# Patient Record
Sex: FEMALE | Race: WHITE | NOT HISPANIC OR LATINO | Employment: FULL TIME | URBAN - METROPOLITAN AREA
[De-identification: names, ages, dates, MRNs, and addresses within clinical notes are randomized per-mention and may not be internally consistent; named-entity substitution may affect disease eponyms.]

---

## 2017-03-30 ENCOUNTER — OFFICE VISIT (OUTPATIENT)
Dept: INTERNAL MEDICINE | Facility: CLINIC | Age: 27
End: 2017-03-30

## 2017-03-30 VITALS
HEART RATE: 82 BPM | BODY MASS INDEX: 20.64 KG/M2 | WEIGHT: 136.2 LBS | DIASTOLIC BLOOD PRESSURE: 78 MMHG | TEMPERATURE: 98.1 F | HEIGHT: 68 IN | SYSTOLIC BLOOD PRESSURE: 118 MMHG | OXYGEN SATURATION: 99 %

## 2017-03-30 DIAGNOSIS — Z00.00 ANNUAL PHYSICAL EXAM: Primary | ICD-10-CM

## 2017-03-30 DIAGNOSIS — Z13.29 SCREENING FOR THYROID DISORDER: ICD-10-CM

## 2017-03-30 DIAGNOSIS — Z13.220 SCREENING FOR HYPERLIPIDEMIA: ICD-10-CM

## 2017-03-30 DIAGNOSIS — Z13.1 SCREENING FOR DIABETES MELLITUS: ICD-10-CM

## 2017-03-30 DIAGNOSIS — R21 SKIN RASH: ICD-10-CM

## 2017-03-30 DIAGNOSIS — Z00.00 HEALTH CARE MAINTENANCE: ICD-10-CM

## 2017-03-30 PROCEDURE — 93000 ELECTROCARDIOGRAM COMPLETE: CPT | Performed by: INTERNAL MEDICINE

## 2017-03-30 PROCEDURE — 99395 PREV VISIT EST AGE 18-39: CPT | Performed by: INTERNAL MEDICINE

## 2017-03-30 RX ORDER — PRENATAL VIT NO.126/IRON/FOLIC 28MG-0.8MG
TABLET ORAL DAILY
COMMUNITY

## 2017-03-30 RX ORDER — TRIAMCINOLONE ACETONIDE 0.25 MG/G
OINTMENT TOPICAL 2 TIMES DAILY
Qty: 15 G | Refills: 0 | Status: SHIPPED | OUTPATIENT
Start: 2017-03-30

## 2017-03-30 NOTE — PROGRESS NOTES
Patient Name: Taylor Vazquez is a 27 y.o. female presenting for Annual Exam and Rash    CPE: patient is her to establish care and works out 5 days per week. Overall, she eats a good diet and her and her  try to eat organic if possible. She has been meditating to help with anxiety. She does not take any medications and her vaccines and pap smear are up to date.     Well Adult Physical   Patient here for a comprehensive physical exam.The patient reports problems - Skin rash that she has had for years and has used over the counter ointments that has not always helped. The rash is red and itchy on both of her knees. It never bleeds or hurts.     Do you take any herbs or supplements that were not prescribed by a doctor? no   Are you taking calcium supplements? no   Are you taking aspirin daily? no     History:  LMP:Normal and coming every month and is trying to get pregnant   Last pap date: 2016  Abnormal pap? no  : 0  Para: 0    Taylor Cho 27 y.o. female who presents for an Annual Wellness Visit.  she has a history of   Patient Active Problem List   Diagnosis   • Skin rash   • Annual physical exam   She has been doing well with new interval problems.      Health Habits:  Dental Exam. up to date  Eye Exam. up to date  Exercise: 6 times/week.  Current exercise activities include: cardiovascular workout on exercise equipment, light weights/kettlebells and weightlifting    Last pap date: 2016  Abnormal pap? Never   Mammogram:N/A   Dexa:N/A  Colonoscopy:N/A  Tob use:Never       The following portions of the patient's history were reviewed and updated as appropriate: allergies, current medications, past family history, past medical history, past social history, past surgical history and problem list.    Review of Systems   Constitutional: Negative for activity change, chills and fever.   HENT: Negative for congestion and sinus pressure.    Respiratory: Negative for cough and  "shortness of breath.    Cardiovascular: Negative for chest pain and palpitations.   Gastrointestinal: Negative for abdominal pain, diarrhea, nausea and vomiting.   Genitourinary: Negative for difficulty urinating and dysuria.   Musculoskeletal: Negative for arthralgias and gait problem.   Skin: Positive for rash.   Neurological: Negative for dizziness and headaches.   Psychiatric/Behavioral: Negative for sleep disturbance.       Review of patient's allergies indicates no known allergies.      Current Outpatient Prescriptions:   •  Prenatal Vit-Fe Fumarate-FA (PRENATAL, CLASSIC, VITAMIN) 28-0.8 MG tablet tablet, Take  by mouth Daily., Disp: , Rfl:   •  triamcinolone (KENALOG) 0.025 % ointment, Apply  topically 2 (Two) Times a Day., Disp: 15 g, Rfl: 0    OBJECTIVE    /78 (BP Location: Left arm, Patient Position: Sitting, Cuff Size: Adult)  Pulse 82  Temp 98.1 °F (36.7 °C) (Oral)   Ht 68\" (172.7 cm)  Wt 136 lb 3.2 oz (61.8 kg)  SpO2 99%  BMI 20.71 kg/m2      Physical Exam   Constitutional: She is oriented to person, place, and time. She appears well-developed and well-nourished. No distress.   HENT:   Head: Normocephalic and atraumatic.   Right Ear: Tympanic membrane and external ear normal.   Left Ear: Tympanic membrane and external ear normal.   Nose: Nose normal.   Mouth/Throat: Uvula is midline, oropharynx is clear and moist and mucous membranes are normal. No oropharyngeal exudate. Tonsils are 0 on the right. Tonsils are 0 on the left. No tonsillar exudate.   Eyes: Conjunctivae, EOM and lids are normal. Pupils are equal, round, and reactive to light. Right eye exhibits no discharge. Left eye exhibits no discharge. No scleral icterus. Right eye exhibits normal extraocular motion.   Neck: Full passive range of motion without pain. Neck supple. Carotid bruit is not present. No thyromegaly present.   Cardiovascular: Normal rate, regular rhythm and normal heart sounds.  Exam reveals no gallop and no " friction rub.    No murmur heard.  Pulmonary/Chest: Effort normal and breath sounds normal. No respiratory distress. She has no wheezes. She has no rales.   Abdominal: Soft. Bowel sounds are normal. She exhibits no distension and no mass. There is no tenderness. There is no guarding.   Musculoskeletal: She exhibits no edema or tenderness.   Lymphadenopathy:     She has no cervical adenopathy.   Neurological: She is alert and oriented to person, place, and time. She displays normal reflexes. She exhibits normal muscle tone.   Skin: Skin is warm. No rash noted.   Psychiatric: She has a normal mood and affect. Her behavior is normal.   Nursing note and vitals reviewed.      ECG 12 Lead  Date/Time: 3/30/2017 8:55 AM  Performed by: SEN ZURITA  Authorized by: SEN ZURITA   Previous ECG: no previous ECG available  Rhythm: sinus rhythm  Rate: normal  ST Segments: ST segments normal  T Waves: T waves normal  QRS axis: normal  Other: no other findings  Clinical impression: normal ECG            ASSESSMENT AND PLAN  Basia is very healthy and has no medical problems other than her skin rash today which I think may be psoriasis due to the location.We will try a topical steroid ointment to use to help with rash when she needs it.  She is in excellent health and we discussed the importance of a healthy diet and exercise as well daily prenatal vitamin as she is trying to get pregnant.  She will get Tdap when she is pregnant. She is going to continue to follow a low fat, low cholesterol diet, reduce exposure to stress, continue current medications and continue current healthy lifestyle patterns. I will call her back with labs and she will follow up as needed for sick visits or concerns.     Taylor was seen today for annual exam and rash.    Diagnoses and all orders for this visit:    Annual physical exam    Screening for thyroid disorder  -     TSH Rfx On Abnormal To Free T4    Screening for hyperlipidemia  -     Lipid  Panel With LDL / HDL Ratio    Screening for diabetes mellitus  -     CBC & Differential    Skin rash    Health care maintenance  -     CBC & Differential  -     Comprehensive Metabolic Panel  -     Urinalysis With / Culture If Indicated  -     ECG 12 Lead    Other orders  -     triamcinolone (KENALOG) 0.025 % ointment; Apply  topically 2 (Two) Times a Day.            Return if symptoms worsen or fail to improve.

## 2017-04-01 LAB
ALBUMIN SERPL-MCNC: 4.6 G/DL (ref 3.5–5.2)
ALBUMIN/GLOB SERPL: 2 G/DL
ALP SERPL-CCNC: 35 U/L (ref 40–129)
ALT SERPL-CCNC: 17 U/L (ref 5–33)
APPEARANCE UR: CLEAR
AST SERPL-CCNC: 20 U/L (ref 5–32)
BACTERIA #/AREA URNS HPF: NORMAL /HPF
BACTERIA UR CULT: ABNORMAL
BACTERIA UR CULT: ABNORMAL
BASOPHILS # BLD AUTO: 0.04 10*3/MM3 (ref 0–0.2)
BASOPHILS NFR BLD AUTO: 1 % (ref 0–2)
BILIRUB SERPL-MCNC: 0.4 MG/DL (ref 0.2–1.2)
BILIRUB UR QL STRIP: NEGATIVE
BUN SERPL-MCNC: 17 MG/DL (ref 6–20)
BUN/CREAT SERPL: 19.1 (ref 7–25)
CALCIUM SERPL-MCNC: 9.2 MG/DL (ref 8.6–10.5)
CHLORIDE SERPL-SCNC: 103 MMOL/L (ref 98–107)
CHOLEST SERPL-MCNC: 165 MG/DL (ref 0–200)
CO2 SERPL-SCNC: 28.3 MMOL/L (ref 22–29)
COLOR UR: YELLOW
CREAT SERPL-MCNC: 0.89 MG/DL (ref 0.57–1)
EOSINOPHIL # BLD AUTO: 0.07 10*3/MM3 (ref 0.1–0.3)
EOSINOPHIL NFR BLD AUTO: 1.8 % (ref 0–4)
EPI CELLS #/AREA URNS HPF: NORMAL /HPF
ERYTHROCYTE [DISTWIDTH] IN BLOOD BY AUTOMATED COUNT: 12.8 % (ref 11.5–14.5)
GLOBULIN SER CALC-MCNC: 2.3 GM/DL
GLUCOSE SERPL-MCNC: 91 MG/DL (ref 65–99)
GLUCOSE UR QL: NEGATIVE
HCT VFR BLD AUTO: 45.1 % (ref 37–47)
HDLC SERPL-MCNC: 66 MG/DL (ref 40–60)
HGB BLD-MCNC: 14.5 G/DL (ref 12–16)
HGB UR QL STRIP: NEGATIVE
IMM GRANULOCYTES # BLD: 0 10*3/MM3 (ref 0–0.03)
IMM GRANULOCYTES NFR BLD: 0 % (ref 0–0.5)
KETONES UR QL STRIP: NEGATIVE
LDLC SERPL CALC-MCNC: 92 MG/DL (ref 0–100)
LDLC/HDLC SERPL: 1.4 {RATIO}
LEUKOCYTE ESTERASE UR QL STRIP: ABNORMAL
LYMPHOCYTES # BLD AUTO: 1.26 10*3/MM3 (ref 0.6–4.8)
LYMPHOCYTES NFR BLD AUTO: 32.2 % (ref 20–45)
MCH RBC QN AUTO: 29.7 PG (ref 27–31)
MCHC RBC AUTO-ENTMCNC: 32.2 G/DL (ref 31–37)
MCV RBC AUTO: 92.2 FL (ref 81–99)
MICRO URNS: ABNORMAL
MONOCYTES # BLD AUTO: 0.41 10*3/MM3 (ref 0–1)
MONOCYTES NFR BLD AUTO: 10.5 % (ref 3–8)
MUCOUS THREADS URNS QL MICRO: PRESENT /HPF
NEUTROPHILS # BLD AUTO: 2.13 10*3/MM3 (ref 1.5–8.3)
NEUTROPHILS NFR BLD AUTO: 54.5 % (ref 45–70)
NITRITE UR QL STRIP: NEGATIVE
NRBC BLD AUTO-RTO: 0 /100 WBC (ref 0–0)
OTHER ANTIBIOTIC SUSC ISLT: ABNORMAL
PH UR STRIP: 6.5 [PH] (ref 5–7.5)
PLATELET # BLD AUTO: 179 10*3/MM3 (ref 140–500)
POTASSIUM SERPL-SCNC: 4 MMOL/L (ref 3.5–5.2)
PROT SERPL-MCNC: 6.9 G/DL (ref 6–8.5)
PROT UR QL STRIP: NEGATIVE
RBC # BLD AUTO: 4.89 10*6/MM3 (ref 4.2–5.4)
RBC #/AREA URNS HPF: NORMAL /HPF
SODIUM SERPL-SCNC: 142 MMOL/L (ref 136–145)
SP GR UR: 1.03 (ref 1–1.03)
T4 FREE SERPL-MCNC: 1.2 NG/DL (ref 0.93–1.7)
TRIGL SERPL-MCNC: 34 MG/DL (ref 0–150)
TSH SERPL DL<=0.005 MIU/L-ACNC: 5.13 MIU/ML (ref 0.27–4.2)
URINALYSIS REFLEX: ABNORMAL
UROBILINOGEN UR STRIP-MCNC: 0.2 MG/DL (ref 0.2–1)
VLDLC SERPL CALC-MCNC: 6.8 MG/DL (ref 7–27)
WBC # BLD AUTO: 3.91 10*3/MM3 (ref 4.8–10.8)
WBC #/AREA URNS HPF: NORMAL /HPF

## 2017-04-03 ENCOUNTER — DOCUMENTATION (OUTPATIENT)
Dept: INTERNAL MEDICINE | Facility: CLINIC | Age: 27
End: 2017-04-03

## 2017-04-03 RX ORDER — NITROFURANTOIN 25; 75 MG/1; MG/1
100 CAPSULE ORAL 2 TIMES DAILY
Qty: 14 CAPSULE | Refills: 0 | Status: SHIPPED | OUTPATIENT
Start: 2017-04-03 | End: 2017-04-10

## 2017-04-03 NOTE — PROGRESS NOTES
I called patient and discussed labs results and sent in Macrobid to pharmacy for her for E. faecalis UTI or possible colonization. Will repeat TSH and FT4 in 4-6 weeks to make sure that subclinical hypothyroidism has resolved. Patient's period is two days late if pregnant, we discussed that Macrobid would be safe.

## 2017-04-04 ENCOUNTER — TELEPHONE (OUTPATIENT)
Dept: INTERNAL MEDICINE | Facility: CLINIC | Age: 27
End: 2017-04-04

## 2017-04-04 DIAGNOSIS — R79.89 ELEVATED TSH: Primary | ICD-10-CM

## 2017-04-04 NOTE — TELEPHONE ENCOUNTER
----- Message from Tania Olmedo MD sent at 4/3/2017  7:41 AM EDT -----  Please call patient with these results and let her know that everything looks great on her labs, but I would like to call her about her thyroid and urine test to discuss in more detail. When is a good time for me to call today either around 12:30 around lunch or after work around 5pm?

## 2018-04-16 ENCOUNTER — OFFICE VISIT (OUTPATIENT)
Dept: INTERNAL MEDICINE | Facility: CLINIC | Age: 28
End: 2018-04-16

## 2018-04-16 VITALS
RESPIRATION RATE: 14 BRPM | TEMPERATURE: 98.2 F | SYSTOLIC BLOOD PRESSURE: 118 MMHG | HEART RATE: 81 BPM | BODY MASS INDEX: 21.95 KG/M2 | DIASTOLIC BLOOD PRESSURE: 74 MMHG | HEIGHT: 68 IN | WEIGHT: 144.8 LBS | OXYGEN SATURATION: 98 %

## 2018-04-16 DIAGNOSIS — R10.13 COLICKY EPIGASTRIC PAIN: Primary | ICD-10-CM

## 2018-04-16 DIAGNOSIS — R19.5 LIGHT STOOLS: ICD-10-CM

## 2018-04-16 DIAGNOSIS — R11.0 NAUSEA: ICD-10-CM

## 2018-04-16 LAB
ALBUMIN SERPL-MCNC: 4.3 G/DL (ref 3.5–5.2)
ALBUMIN/GLOB SERPL: 1.8 G/DL
ALP SERPL-CCNC: 39 U/L (ref 40–129)
ALT SERPL-CCNC: 13 U/L (ref 5–33)
AST SERPL-CCNC: 20 U/L (ref 5–32)
BASOPHILS # BLD AUTO: 0.02 10*3/MM3 (ref 0–0.2)
BASOPHILS NFR BLD AUTO: 0.2 % (ref 0–2)
BILIRUB SERPL-MCNC: 0.2 MG/DL (ref 0.2–1.2)
BUN SERPL-MCNC: 12 MG/DL (ref 6–20)
BUN/CREAT SERPL: 16 (ref 7–25)
CALCIUM SERPL-MCNC: 9.4 MG/DL (ref 8.6–10.5)
CHLORIDE SERPL-SCNC: 101 MMOL/L (ref 98–107)
CO2 SERPL-SCNC: 28.3 MMOL/L (ref 22–29)
CREAT SERPL-MCNC: 0.75 MG/DL (ref 0.57–1)
EOSINOPHIL # BLD AUTO: 1.48 10*3/MM3 (ref 0.1–0.3)
EOSINOPHIL NFR BLD AUTO: 13.7 % (ref 0–4)
ERYTHROCYTE [DISTWIDTH] IN BLOOD BY AUTOMATED COUNT: 12.3 % (ref 11.5–14.5)
GFR SERPLBLD CREATININE-BSD FMLA CKD-EPI: 110 ML/MIN/1.73
GFR SERPLBLD CREATININE-BSD FMLA CKD-EPI: 91 ML/MIN/1.73
GLOBULIN SER CALC-MCNC: 2.4 GM/DL
GLUCOSE SERPL-MCNC: 85 MG/DL (ref 65–99)
HCT VFR BLD AUTO: 42.7 % (ref 37–47)
HGB BLD-MCNC: 14.4 G/DL (ref 12–16)
IMM GRANULOCYTES # BLD: 0.02 10*3/MM3 (ref 0–0.03)
IMM GRANULOCYTES NFR BLD: 0.2 % (ref 0–0.5)
LIPASE SERPL-CCNC: 35 U/L (ref 13–60)
LYMPHOCYTES # BLD AUTO: 2.12 10*3/MM3 (ref 0.6–4.8)
LYMPHOCYTES NFR BLD AUTO: 19.6 % (ref 20–45)
MCH RBC QN AUTO: 30.4 PG (ref 27–31)
MCHC RBC AUTO-ENTMCNC: 33.7 G/DL (ref 31–37)
MCV RBC AUTO: 90.3 FL (ref 81–99)
MONOCYTES # BLD AUTO: 0.91 10*3/MM3 (ref 0–1)
MONOCYTES NFR BLD AUTO: 8.4 % (ref 3–8)
NEUTROPHILS # BLD AUTO: 6.29 10*3/MM3 (ref 1.5–8.3)
NEUTROPHILS NFR BLD AUTO: 57.9 % (ref 45–70)
NRBC BLD AUTO-RTO: 0 /100 WBC (ref 0–0)
PLATELET # BLD AUTO: 176 10*3/MM3 (ref 140–500)
POTASSIUM SERPL-SCNC: 4 MMOL/L (ref 3.5–5.2)
PROT SERPL-MCNC: 6.7 G/DL (ref 6–8.5)
RBC # BLD AUTO: 4.73 10*6/MM3 (ref 4.2–5.4)
SODIUM SERPL-SCNC: 140 MMOL/L (ref 136–145)
WBC # BLD AUTO: 10.84 10*3/MM3 (ref 4.8–10.8)

## 2018-04-16 PROCEDURE — 99213 OFFICE O/P EST LOW 20 MIN: CPT | Performed by: INTERNAL MEDICINE

## 2018-04-16 NOTE — PROGRESS NOTES
"Taylor Cho is a 28 y.o. female, who presents with a chief complaint of   Chief Complaint   Patient presents with   • Stool Color Change     grey color, 1 x day    • Abdominal Pain     1 x week, dull pain        27 yo F here for acute visit and started having epigastric abdominal pain about 10 days ago.     Started Nexium last Wednesay that has not helped. Morning time is better. Any food now is causing it flair now. Lighter grey stools this morning. The pain is dull and achy. Crackers and zenaida seltzer makes it better. She has been having harder stools lately. She is trying to get pregnant and is on Femara, Progesterone and Menopur.          The following portions of the patient's history were reviewed and updated as appropriate: allergies, current medications, past family history, past medical history, past social history, past surgical history and problem list.    Allergies: Review of patient's allergies indicates no known allergies.    Current Outpatient Prescriptions:   •  Esomeprazole Magnesium (NEXIUM PO), Take  by mouth., Disp: , Rfl:   •  Prenatal Vit-Fe Fumarate-FA (PRENATAL, CLASSIC, VITAMIN) 28-0.8 MG tablet tablet, Take  by mouth Daily., Disp: , Rfl:   •  triamcinolone (KENALOG) 0.025 % ointment, Apply  topically 2 (Two) Times a Day., Disp: 15 g, Rfl: 0  There are no discontinued medications.    Review of Systems   Constitutional: Negative for chills and fatigue.   Gastrointestinal: Positive for abdominal pain (epigastric pain) and nausea. Negative for blood in stool, diarrhea and vomiting.        Lighter stools    Genitourinary: Negative for difficulty urinating and dysuria.   Skin: Negative for rash.   Neurological: Negative for dizziness and headaches.             /74 (BP Location: Left arm, Patient Position: Sitting, Cuff Size: Adult)   Pulse 81   Temp 98.2 °F (36.8 °C) (Oral)   Resp 14   Ht 172.7 cm (67.99\")   Wt 65.7 kg (144 lb 12.8 oz)   SpO2 98%   BMI 22.02 kg/m² "       Physical Exam   Constitutional: She is oriented to person, place, and time. She appears well-developed and well-nourished. No distress.   HENT:   Head: Normocephalic and atraumatic.   Right Ear: External ear normal.   Left Ear: External ear normal.   Mouth/Throat: Oropharynx is clear and moist. No oropharyngeal exudate.   Eyes: Conjunctivae are normal. Right eye exhibits no discharge. Left eye exhibits no discharge. No scleral icterus.   Neck: Neck supple.   Cardiovascular: Normal rate, regular rhythm and normal heart sounds.  Exam reveals no gallop and no friction rub.    No murmur heard.  Pulmonary/Chest: Effort normal and breath sounds normal. No respiratory distress. She has no wheezes. She has no rales.   Abdominal: Soft. Bowel sounds are normal. She exhibits no distension and no mass. There is no tenderness. There is no guarding.   Lymphadenopathy:     She has no cervical adenopathy.   Neurological: She is alert and oriented to person, place, and time.   Skin: Skin is warm. No rash noted.   Psychiatric: She has a normal mood and affect. Her behavior is normal.   Nursing note and vitals reviewed.        Results for orders placed or performed in visit on 03/30/17   Urine culture, Comprehensive   Result Value Ref Range    Urine Culture Final report (A)     Result 1 Enterococcus faecalis (A)     Susceptibility Testing Comment    Comprehensive Metabolic Panel   Result Value Ref Range    Glucose 91 65 - 99 mg/dL    BUN 17 6 - 20 mg/dL    Creatinine 0.89 0.57 - 1.00 mg/dL    eGFR Non African Am 75 >60 mL/min/1.73    eGFR African Am 91 >60 mL/min/1.73    BUN/Creatinine Ratio 19.1 7.0 - 25.0    Sodium 142 136 - 145 mmol/L    Potassium 4.0 3.5 - 5.2 mmol/L    Chloride 103 98 - 107 mmol/L    Total CO2 28.3 22.0 - 29.0 mmol/L    Calcium 9.2 8.6 - 10.5 mg/dL    Total Protein 6.9 6.0 - 8.5 g/dL    Albumin 4.60 3.50 - 5.20 g/dL    Globulin 2.3 gm/dL    A/G Ratio 2.0 g/dL    Total Bilirubin 0.4 0.2 - 1.2 mg/dL     Alkaline Phosphatase 35 (L) 40 - 129 U/L    AST (SGOT) 20 5 - 32 U/L    ALT (SGPT) 17 5 - 33 U/L   Lipid Panel With LDL / HDL Ratio   Result Value Ref Range    Total Cholesterol 165 0 - 200 mg/dL    Triglycerides 34 0 - 150 mg/dL    HDL Cholesterol 66 (H) 40 - 60 mg/dL    VLDL Cholesterol 6.8 (L) 7 - 27 mg/dL    LDL Cholesterol  92 0 - 100 mg/dL    LDL/HDL Ratio 1.40    TSH Rfx On Abnormal To Free T4   Result Value Ref Range    TSH 5.13 (H) 0.27 - 4.2 mIU/mL   Urinalysis With / Culture If Indicated   Result Value Ref Range    Specific Gravity, UA 1.029 1.005 - 1.030    pH, UA 6.5 5.0 - 7.5    Color, UA Yellow Yellow    Appearance, UA Clear Clear    Leukocytes, UA 1+ (A) Negative    Protein Negative Negative/Trace    Glucose, UA Negative Negative    Ketones Negative Negative    Blood, UA Negative Negative    Bilirubin, UA Negative Negative    Urobilinogen, UA 0.2 0.2 - 1.0 mg/dL    Nitrite, UA Negative Negative    Microscopic Examination See below:     Urinalysis Reflex Comment    Microscopic Examination   Result Value Ref Range    WBC, UA 0-5 0 - 5 /hpf    RBC, UA 0-2 0 - 2 /hpf    Epithelial Cells (non renal) 0-10 0 - 10 /hpf    Mucus, UA Present Not Estab. /hpf    Bacteria, UA Few None seen/Few /hpf   T4F   Result Value Ref Range    Free T4 1.20 0.93 - 1.70 ng/dL   CBC & Differential   Result Value Ref Range    WBC 3.91 (L) 4.80 - 10.80 10*3/mm3    RBC 4.89 4.20 - 5.40 10*6/mm3    Hemoglobin 14.5 12.0 - 16.0 g/dL    Hematocrit 45.1 37.0 - 47.0 %    MCV 92.2 81.0 - 99.0 fL    MCH 29.7 27.0 - 31.0 pg    MCHC 32.2 31.0 - 37.0 g/dL    RDW 12.8 11.5 - 14.5 %    Platelets 179 140 - 500 10*3/mm3    Neutrophil Rel % 54.5 45.0 - 70.0 %    Lymphocyte Rel % 32.2 20.0 - 45.0 %    Monocyte Rel % 10.5 (H) 3.0 - 8.0 %    Eosinophil Rel % 1.8 0.0 - 4.0 %    Basophil Rel % 1.0 0.0 - 2.0 %    Neutrophils Absolute 2.13 1.50 - 8.30 10*3/mm3    Lymphocytes Absolute 1.26 0.60 - 4.80 10*3/mm3    Monocytes Absolute 0.41 0.00 - 1.00  10*3/mm3    Eosinophils Absolute 0.07 (L) 0.10 - 0.30 10*3/mm3    Basophils Absolute 0.04 0.00 - 0.20 10*3/mm3    Immature Granulocyte Rel % 0.0 0.0 - 0.5 %    Immature Grans Absolute 0.00 0.00 - 0.03 10*3/mm3    nRBC 0.0 0.0 - 0.0 /100 WBC           Taylor was seen today for stool color change and abdominal pain.    Diagnoses and all orders for this visit:    Colicky epigastric pain  -     Cancel: US Liver  -     CBC & Differential  -     Comprehensive Metabolic Panel  -     Lipase  -     US Abdomen Complete    Light stools  -     Cancel: US Liver  -     CBC & Differential  -     Comprehensive Metabolic Panel  -     Lipase  -     US Abdomen Complete    Nausea        Differential is broad and includes gall bladder issues vs ulcer vs side effects from fertility treatments. Will check blood work today and get u/s of her RUQ to evaluate. Continue Nexium, [place] and bland diet. Call if dark stools or severe nausea until we get results back. May consider HIDA scan if u/s is abnormal with lighter stools.        Return if symptoms worsen or fail to improve.    Tania Olmedo MD  04/16/2018

## 2018-04-17 NOTE — PROGRESS NOTES
Discussed labs with patient. All are normal except slight elevation in WBC and eosinophils. She has really bad allergies and discussed this with her.

## 2018-04-19 ENCOUNTER — DOCUMENTATION (OUTPATIENT)
Dept: INTERNAL MEDICINE | Facility: CLINIC | Age: 28
End: 2018-04-19

## 2018-04-19 RX ORDER — ESOMEPRAZOLE MAGNESIUM 40 MG/1
40 CAPSULE, DELAYED RELEASE ORAL
Qty: 30 CAPSULE | Refills: 2 | Status: SHIPPED | OUTPATIENT
Start: 2018-04-19 | End: 2018-05-19

## 2018-04-24 DIAGNOSIS — G89.29 CHRONIC EPIGASTRIC PAIN: Primary | ICD-10-CM

## 2018-04-24 DIAGNOSIS — R11.0 NAUSEA: ICD-10-CM

## 2018-04-24 DIAGNOSIS — R10.13 CHRONIC EPIGASTRIC PAIN: Primary | ICD-10-CM

## 2018-04-26 DIAGNOSIS — R10.13 CHRONIC EPIGASTRIC PAIN: Primary | ICD-10-CM

## 2018-04-26 DIAGNOSIS — G89.29 CHRONIC EPIGASTRIC PAIN: Primary | ICD-10-CM

## 2018-04-26 DIAGNOSIS — R11.0 NAUSEA: ICD-10-CM

## 2018-04-27 ENCOUNTER — DOCUMENTATION (OUTPATIENT)
Dept: INTERNAL MEDICINE | Facility: CLINIC | Age: 28
End: 2018-04-27

## 2018-04-27 RX ORDER — SUCRALFATE 1 G/1
1 TABLET ORAL 4 TIMES DAILY
Qty: 120 TABLET | Refills: 0 | Status: SHIPPED | OUTPATIENT
Start: 2018-04-27 | End: 2018-05-27

## 2018-04-28 LAB — H PYLORI AG STL QL IA: NEGATIVE

## 2018-04-30 DIAGNOSIS — M54.9 PAIN RADIATING TO BACK: ICD-10-CM

## 2018-04-30 DIAGNOSIS — R11.0 NAUSEA: ICD-10-CM

## 2018-04-30 DIAGNOSIS — G89.29 ABDOMINAL PAIN, CHRONIC, EPIGASTRIC: Primary | ICD-10-CM

## 2018-04-30 DIAGNOSIS — R10.13 ABDOMINAL PAIN, CHRONIC, EPIGASTRIC: Primary | ICD-10-CM

## 2019-05-07 DIAGNOSIS — L53.9 ERYTHEMA OF LOWER EXTREMITY: ICD-10-CM

## 2019-05-07 DIAGNOSIS — M79.605 PAIN OF LEFT LOWER EXTREMITY: Primary | ICD-10-CM
